# Patient Record
Sex: MALE | Race: WHITE
[De-identification: names, ages, dates, MRNs, and addresses within clinical notes are randomized per-mention and may not be internally consistent; named-entity substitution may affect disease eponyms.]

---

## 2018-10-24 ENCOUNTER — HOSPITAL ENCOUNTER (EMERGENCY)
Dept: HOSPITAL 97 - ER | Age: 78
Discharge: HOME | End: 2018-10-24
Payer: COMMERCIAL
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
## 2018-10-24 DIAGNOSIS — S70.01XA: Primary | ICD-10-CM

## 2018-10-24 DIAGNOSIS — S20.20XA: ICD-10-CM
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
## 2018-10-24 DIAGNOSIS — I10: ICD-10-CM
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
## 2018-10-24 PROCEDURE — 99283 EMERGENCY DEPT VISIT LOW MDM: CPT

## 2018-10-24 NOTE — RAD REPORT
EXAM DESCRIPTION:  RAD - Ribs  Right - 10/24/2018 4:06 pm

 

CLINICAL HISTORY:  PAIN

Fall

 

COMPARISON:  No comparisons

 

FINDINGS:  Diffuse osteopenia is noted. No displaced fracture is seen. Given the degree of osteopenia
, if pain persists, CT imaging would be useful.

## 2018-10-24 NOTE — RAD REPORT
EXAM DESCRIPTION:  RAD - Hip Right 2 View - 10/24/2018 4:07 pm

 

CLINICAL HISTORY:  Right hip pain status post fall

 

FINDINGS:  No fracture or dislocation is seen.

 

  The bones are osteoporotic.

 

If patient continues to have symptoms to suggest an occult fracture MRI would be recommended

## 2018-10-24 NOTE — EDPHYS
Physician Documentation                                                                           

 [facility]                                                                

Name: Hood Gutierrez                                                                                

Age: 77 yrs                                                                                       

Sex: Male                                                                                         

: 1940                                                                                   

MRN: [id number]                                                                                   

Arrival Date: 10/24/2018                                                                          

Time: 14:07                                                                                       

Account#: [id number]                                                                            

Bed 13                                                                                            

Private MD: HOOD RICHMOND                                                                           

ED Physician Maurizio Brooks                                                                       

HPI:                                                                                              

10/24                                                                                             

16:19 This 77 yrs old  Male presents to ER via Wheelchair with complaints of Fall    jr8 

      Injury - HIP,BACK.                                                                          

16:19 Details of fall: The patient fell from an upright position, while standing. Onset: The  jr8 

      symptoms/episode began/occurred acutely, 2 day(s) ago. Associated injuries: The patient     

      sustained mid back, right hip. Severity of symptoms: At their worst the symptoms were       

      mild, in the emergency department the symptoms are unchanged. The patient has not           

      experienced similar symptoms in the past. The patient has not recently seen a               

      physician. Patient stated that he was walking on his wood floors with his socks at          

      home. Slipped falling backwards landing on back and right hip. Pain since then. Able to     

      bear weight .                                                                               

                                                                                                  

Historical:                                                                                       

- Allergies:                                                                                      

14:26 No Known Allergies;                                                                     aj1 

- PMHx:                                                                                           

14:26 acid reflux; Hypertension; Hypothyroidism; LYMPHOMA;                                    aj1 

                                                                                                  

- Immunization history:: Adult Immunizations up to date.                                          

- Social history:: Smoking status: Patient/guardian denies using tobacco.                         

- Ebola Screening: : No symptoms or risks identified at this time.                                

                                                                                                  

                                                                                                  

ROS:                                                                                              

16:19 Eyes: Negative for injury, pain, redness, and discharge, ENT: Negative for injury,      jr8 

      pain, and discharge, Neck: Negative for injury, pain, and swelling, Cardiovascular:         

      Negative for chest pain, palpitations, and edema, Respiratory: Negative for shortness       

      of breath, cough, wheezing, and pleuritic chest pain, Abdomen/GI: Negative for              

      abdominal pain, nausea, vomiting, diarrhea, and constipation, Skin: Negative for            

      injury, rash, and discoloration, Neuro: Negative for headache, weakness, numbness,          

      tingling, and seizure.                                                                      

16:19 Back: Positive for pain at rest, pain with movement, of the right mid back.                 

16:19 MS/extremity: Positive for pain, tenderness, of the right hip.                              

                                                                                                  

Exam:                                                                                             

16:19 Head/Face:  Normocephalic, atraumatic. Eyes:  Pupils equal round and reactive to light, jr8 

      extra-ocular motions intact.  Lids and lashes normal.  Conjunctiva and sclera are           

      non-icteric and not injected.  Cornea within normal limits.  Periorbital areas with no      

      swelling, redness, or edema. ENT:  Nares patent. No nasal discharge, no septal              

      abnormalities noted.  Tympanic membranes are normal and external auditory canals are        

      clear.  Oropharynx with no redness, swelling, or masses, exudates, or evidence of           

      obstruction, uvula midline.  Mucous membranes moist. Neck:  Trachea midline, no             

      thyromegaly or masses palpated, and no cervical lymphadenopathy.  Supple, full range of     

      motion without nuchal rigidity, or vertebral point tenderness.  No Meningismus.             

      Chest/axilla:  Normal chest wall appearance and motion.  Nontender with no deformity.       

      No lesions are appreciated. Cardiovascular:  Regular rate and rhythm with a normal S1       

      and S2.  No gallops, murmurs, or rubs.  Normal PMI, no JVD.  No pulse deficits.             

      Respiratory:  Lungs have equal breath sounds bilaterally, clear to auscultation and         

      percussion.  No rales, rhonchi or wheezes noted.  No increased work of breathing, no        

      retractions or nasal flaring. Abdomen/GI:  Soft, non-tender, with normal bowel sounds.      

      No distension or tympany.  No guarding or rebound.  No evidence of tenderness               

      throughout. Skin:  Warm, dry with normal turgor.  Normal color with no rashes, no           

      lesions, and no evidence of cellulitis. Neuro:  Awake and alert, GCS 15, oriented to        

      person, place, time, and situation.  Cranial nerves II-XII grossly intact.  Motor           

      strength 5/5 in all extremities.  Sensory grossly intact.  Cerebellar exam normal.          

      Normal gait.                                                                                

16:19 Back: pain, that is moderate, of the  right mid back, ROM is normal, normal spinal          

      alignment noted, CVA tenderness, is absent, vertebral tenderness, is not appreciated,       

      bruising noted to right mid back.                                                           

16:19 Musculoskeletal/extremity: Extremities: grossly normal except: noted in the right hip:      

      pain, tenderness, ROM: intact in all extremities, full active range of motion, full         

      passive range of motion, Circulation is intact in all extremities. Sensation intact.        

                                                                                                  

Vital Signs:                                                                                      

14:26  / 80; Pulse 63; Resp 18; Temp 97.9; Pulse Ox 98% on R/A; Weight 83.91 kg (R);    aj1 

      Height 5 ft. 9 in. (175.26 cm) (R); Pain 0/10;                                              

14:26 Body Mass Index 27.32 (83.91 kg, 175.26 cm)                                             aj1 

                                                                                                  

MDM:                                                                                              

15:27 Patient medically screened.                                                             jr8 

16:41 Data reviewed: vital signs, nurses notes, radiologic studies, plain films, and as a     jr8 

      result, I will discharge patient. Data interpreted: Pulse oximetry: on room air is 98       

      %. Interpretation: normal. Counseling: I had a detailed discussion with the patient         

      and/or guardian regarding: the historical points, exam findings, and any diagnostic         

      results supporting the discharge/admit diagnosis, radiology results, the need for           

      outpatient follow up, a family practitioner, to return to the emergency department if       

      symptoms worsen or persist or if there are any questions or concerns that arise at home.    

                                                                                                  

10/24                                                                                             

15:28 Order name: XRAY Ribs RIGHT                                                             jr8 

10/24                                                                                             

15:28 Order name: XRAY Hip RIGHT 2 view; Complete Time: 16:18                                 jr8 

                                                                                                  

Administered Medications:                                                                         

16:58 Drug: Norco (7.5 mg-325 mg) 1 tabs Route: PO;                                           la1 

16:58 Follow up: Response: No adverse reaction                                                la1 

                                                                                                  

                                                                                                  

Disposition:                                                                                      

10/24/18 16:42 Discharged to Home. Impression: Contusion of right hip, Rib contusion .            

- Condition is Stable.                                                                            

- Discharge Instructions: Contusion, Hip Pain.                                                    

                                                                                                  

- Medication Reconciliation Form, Thank You Letter, Antibiotic Education, Prescription            

  Opioid Use form.                                                                                

- Follow up: Private Physician; When: 5 - 6 days; Reason: Recheck today's complaints,             

  Continuance of care, Re-evaluation by your physician.                                           

- Problem is new.                                                                                 

- Symptoms have improved.                                                                         

                                                                                                  

                                                                                                  

                                                                                                  

Addendum:                                                                                         

2018                                                                                        

     08:08 Co-signature as Attending Physician, Maurizio Brooks MD I agree with the assessment and   k
dr

           plan of care.                                                                          

                                                                                                  

Signatures:                                                                                       

Dispatcher [employer]                           EDIlda Braxton RN                     RN   aj1                                                  

Maurizio Brooks MD MD   [facility]                                                  

Luis Reyes PA                        PA   jr8                                                  

Javier Bazzi RN                         RN   la1                                                  

                                                                                                  

Corrections: (The following items were deleted from the chart)                                    

10/24                                                                                             

16:59 16:42 10/24/2018 16:42 Discharged to Home. Impression: Contusion of right hip; Rib      la1 

      contusion . Condition is Stable. Forms are Medication Reconciliation Form, Thank You        

      Letter, Antibiotic Education, Prescription Opioid Use. Follow up: Private Physician;        

      When: 5 - 6 days; Reason: Recheck today's complaints, Continuance of care,                  

      Re-evaluation by your physician. Problem is new. Symptoms have improved. jr8                

                                                                                                  

**************************************************************************************************

## 2018-10-24 NOTE — ER
Nurse's Notes                                                                                     

 [facility]                                                                

Name: Hood Gutierrez                                                                                

Age: 77 yrs                                                                                       

Sex: Male                                                                                         

: 1940                                                                                   

MRN: [id number]                                                                                   

Arrival Date: 10/24/2018                                                                          

Time: 14:07                                                                                       

Account#: [id number]                                                                            

Bed 13                                                                                            

Private MD: HOOD RICHMOND                                                                           

Diagnosis: Contusion of right hip;Rib contusion                                                   

                                                                                                  

Presentation:                                                                                     

10/24                                                                                             

14:23 Presenting complaint: Child states: He fell out of bed 2 days ago, when he was reaching aj1 

      down to get his house shoes. I've been watching him and he doesn't seem to be getting       

      any better. At first he said his back where he has a bruise was the only place that         

      hurts but now he's pointing lower down. Denies LOC. Patient takes ASA 81 mg daily.          

      Denies hitting his head in the fall. Patient states that he only has pain when he           

      moves. Care prior to arrival: None. Mechanism of Injury: Fall out of bed. Trauma event      

      details[place]                                                  

14:23 Acuity: AMI 3                                                                           aj1 

14:23 Method Of Arrival: Wheelchair                                                           aj1 

14:26 Transition of care: patient was not received from another setting of care. Onset of     aj1 

      symptoms was 2018. Risk Assessment: Do you want to hurt yourself or someone     

      else? Patient reports no desire to harm self or others. Initial Sepsis Screen: Does the     

      patient meet any 2 criteria? No. Patient's initial sepsis screen is negative. Does the      

      patient have a suspected source of infection? No. Patient's initial sepsis screen is        

      negative.                                                                                   

                                                                                                  

Triage Assessment:                                                                                

14:26 General: Appears in no apparent distress. comfortable, Behavior is calm, cooperative,   aj1 

      appropriate for age. Pain: Complains of pain in low back area Pain currently is 0 out       

      of 10 on a pain scale. Neuro: Level of Consciousness is awake, alert, obeys commands.       

      Cardiovascular: Patient's skin is warm and dry. Respiratory: Airway is patent               

      Respiratory effort is even, unlabored, Respiratory pattern is regular, symmetrical.         

                                                                                                  

Trauma Activation: Not Applicable                                                                 

 Physician: ED Physician; Name: ; Notified At: ; Arrived At:                                      

 Physician: General Surgeon; Name: ; Notified At: ; Arrived At:                                   

 Physician: Radiology; Name: ; Notified At: ; Arrived At:                                         

 Physician: Respiratory; Name: ; Notified At: ; Arrived At:                                       

 Physician: Lab; Name: ; Notified At: ; Arrived At:                                               

                                                                                                  

Historical:                                                                                       

- Allergies:                                                                                      

14:26 No Known Allergies;                                                                     aj1 

- PMHx:                                                                                           

14:26 acid reflux; Hypertension; Hypothyroidism; LYMPHOMA;                                    aj1 

                                                                                                  

- Immunization history:: Adult Immunizations up to date.                                          

- Social history:: Smoking status: Patient/guardian denies using tobacco.                         

- Ebola Screening: : No symptoms or risks identified at this time.                                

                                                                                                  

                                                                                                  

Screening:                                                                                        

15:24 Abuse screen: Denies threats or abuse. Nutritional screening: No deficits noted.        la1 

      Tuberculosis screening: No symptoms or risk factors identified. Fall Risk None              

      identified.                                                                                 

                                                                                                  

Assessment:                                                                                       

15:24 General: Appears in no apparent distress. Behavior is calm, cooperative. Pain:          la1 

      Complains of pain in back and low back area. Neuro: Level of Consciousness is awake,        

      alert, obeys commands. Cardiovascular: Patient's skin is warm and dry. Respiratory:         

      Airway is patent Respiratory effort is even, unlabored, Respiratory pattern is regular,     

      symmetrical. GI: Abdomen is round non-distended. : No signs and/or symptoms were          

      reported regarding the genitourinary system. Musculoskeletal: Circulation, motion, and      

      sensation intact. Range of motion: intact in all extremities.                               

16:04 Reassessment: Patient appears in no apparent distress at this time. No changes from     la1 

      previously documented assessment. Patient and/or family updated on plan of care and         

      expected duration. Pain level reassessed.                                                   

                                                                                                  

Vital Signs:                                                                                      

14:26  / 80; Pulse 63; Resp 18; Temp 97.9; Pulse Ox 98% on R/A; Weight 83.91 kg (R);    aj1 

      Height 5 ft. 9 in. (175.26 cm) (R); Pain 0/10;                                              

14:26 Body Mass Index 27.32 (83.91 kg, 175.26 cm)                                             aj1 

                                                                                                  

ED Course:                                                                                        

14:07 Patient arrived in ED.                                                                  sb2 

14:07 HOOD RICHMOND is Private Physician.                                                       sb2 

14:26 Triage completed.                                                                       aj1 

14:26 Arm band placed on Patient placed in waiting room, Patient notified of wait time.       aj1 

14:48 Javier Bazzi RN is Primary Nurse.                                                       la1 

15:13 Luis Reyes PA is PHCP.                                                               jr8 

15:13 Maurizio Brooks MD is Attending Physician.                                              jr8 

15:25 Call light in reach. Side rails up X 1.                                                 la1 

15:56 X-ray completed. Patient tolerated procedure well. Patient moved back from radiology.   az  

15:57 XRAY Ribs RIGHT In Process Unspecified.                                                 EDMS

15:57 XRAY Hip RIGHT 2 view In Process Unspecified.                                           EDMS

16:58 No provider procedures requiring assistance completed. Patient did not have IV access   la1 

      during this emergency room visit.                                                           

                                                                                                  

Administered Medications:                                                                         

16:58 Drug: Norco (7.5 mg-325 mg) 1 tabs Route: PO;                                           la1 

16:58 Follow up: Response: No adverse reaction                                                la1 

                                                                                                  

                                                                                                  

Outcome:                                                                                          

16:42 Discharge ordered by MD.                                                                jacobo 

16:58 Discharged to home ambulatory.                                                          la1 

16:58 Condition: stable                                                                           

16:58 Discharge instructions given to patient, Instructed on discharge instructions, follow       

      up and referral plans. medication usage, Demonstrated understanding of instructions,        

      follow-up care, medications.                                                                

16:59 Patient left the ED.                                                                    la1 

                                                                                                  

Signatures:                                                                                       

Dispatcher [employer]                           Ilda Espinoza RN                     RN   aj1                                                  

Luis Reyes PA PA   jr8                                                  

Javier Bazzi RN RN   la1                                                  

Mariama Rodriguez2                                                  

Corinne Beal                                                   

                                                                                                  

Corrections: (The following items were deleted from the chart)                                    

14:28 14:26 Arm band placed on Patient placed in an exam room, aj1                            aj1 

                                                                                                  

**************************************************************************************************

## 2019-03-16 ENCOUNTER — HOSPITAL ENCOUNTER (EMERGENCY)
Dept: HOSPITAL 97 - ER | Age: 79
Discharge: HOME | End: 2019-03-16
Payer: COMMERCIAL
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
## 2019-03-16 DIAGNOSIS — E03.9: ICD-10-CM

## 2019-03-16 DIAGNOSIS — M19.90: ICD-10-CM

## 2019-03-16 DIAGNOSIS — K21.9: ICD-10-CM
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
## 2019-03-16 DIAGNOSIS — M25.512: Primary | ICD-10-CM

## 2019-03-16 DIAGNOSIS — I10: ICD-10-CM

## 2019-03-16 PROCEDURE — 99283 EMERGENCY DEPT VISIT LOW MDM: CPT

## 2019-03-16 NOTE — ER
Nurse's Notes                                                                                     

 [facility]                                                                

Name: Hood Gutierrez                                                                                

Age: 78 yrs                                                                                       

Sex: Male                                                                                         

: 1940                                                                                   

MRN: [id number]                                                                                   

Arrival Date: 2019                                                                          

Time: 15:04                                                                                       

Account#: [id number]                                                                            

Bed 10                                                                                            

Private MD: HOOD RICHMOND                                                                           

Diagnosis: Fall on same level from slipping, tripping and stumbling with subsequent striking      

  against object;Pain in left shoulder;Osteoarthritis, unspecified site                           

                                                                                                  

Presentation:                                                                                     

                                                                                             

15:58 Presenting complaint: Patient states: "I was going down a ramp so my feet came under me aa5 

      and I fell onto my left side". Pt reports fall was 2 days ago. c/o left shoulder pain.      

      Denies head injury, Negative LOC. Transition of care: patient was not received from         

      another setting of care. Onset of symptoms was 2019. Risk Assessment: Do you want     

      to hurt yourself or someone else? Patient reports no desire to harm self or others.         

      Care prior to arrival: None.                                                                

15:58 Method Of Arrival: Ambulatory                                                           aa5 

15:58 Acuity: AMI 4                                                                           aa5 

18:25 Initial Sepsis Screen: Does the patient meet any 2 criteria? No. Patient's initial      fc  

      sepsis screen is negative. Does the patient have a suspected source of infection? No.       

      Patient's initial sepsis screen is negative.                                                

                                                                                                  

Historical:                                                                                       

- Allergies:                                                                                      

16:00 No Known Allergies;                                                                     aa5 

- PMHx:                                                                                           

16:00 acid reflux; Hypertension; Hypothyroidism; LYMPHOMA;                                    aa5 

                                                                                                  

- Immunization history:: Flu vaccine is not up to date.                                           

- Social history:: Smoking status: Patient/guardian denies using tobacco.                         

- Ebola Screening: : No symptoms or risks identified at this time.                                

                                                                                                  

                                                                                                  

Screenin:08 Abuse screen: Denies threats or abuse. Nutritional screening: No deficits noted.        fc  

      Tuberculosis screening: No symptoms or risk factors identified. Fall Risk Fall in past      

      12 months (25 points). No secondary diagnosis (0 pts). No IV (0 pts). Ambulatory Aid-       

      None/Bed Rest/Nurse Assist (0 pts). Gait- Normal/Bed Rest/Wheelchair (0 pts) Mental         

      Status- Overestimates/Forgets Limitations (15 pts.). Total Knowles Fall Scale indicates       

      Low Risk Score (25-44 pts). Fall prevention measures have been instituted. Side Rails       

      Up X 2 Placed close to Nursing Station Frequent Obs/Assesments occuring Family Present      

      and informed to notify staff if they need to leave bedside As available Patient and         

      Family Educated on Fall Prevention Program and strategies.                                  

                                                                                                  

Assessment:                                                                                       

17:08 General: Appears uncomfortable, slender, Behavior is calm, cooperative, appropriate for fc  

      age. Pain: Complains of pain in left shoulder Pain currently is 6 out of 10 on a pain       

      scale. Quality of pain is described as aching, dull, throbbing, Pain began 2-3 days         

      ago. Is episodic, Aggravated by increased activity, repositioning. Neuro: Level of          

      Consciousness is awake, alert, obeys commands, Oriented to person, place, time,             

      situation, Appropriate for age. Cardiovascular: No deficits noted. Respiratory: No          

      deficits noted. GI: No deficits noted. : No deficits noted. EENT: No deficits noted.      

      Derm: Skin is pink, warm \T\ dry. Musculoskeletal: Circulation, motion, and sensation       

      intact. Capillary refill < 3 seconds, Range of motion: limited in left shoulder.            

17:25 Reassessment: No changes from previously documented assessment. Patient and/or family   fc  

      updated on plan of care and expected duration. Pain level reassessed. Patient is alert,     

      oriented x 3, equal unlabored respirations, skin warm/dry/pink. Lynn NP in to see and     

      examine pt.                                                                                 

17:52 Reassessment: Xrays complete and medication given as ordered.                             

18:15 Reassessment: Lynn NP in to speak with pt and wife re: results of xrays. Pt refusing  fc  

      slings states that they have one at home.                                                   

                                                                                                  

Vital Signs:                                                                                      

16:00  / 92; Pulse 62; Resp 16 S; Temp 97.7(TE); Pulse Ox 97% on R/A; Weight 82.55 kg   aa5 

      (R); Pain 2/10;                                                                             

                                                                                                  

ED Course:                                                                                        

15:04 Patient arrived in ED.                                                                  rg4 

15:04 HOOD RICHMOND is Private Physician.                                                       rg4 

15:58 Arm band placed on.                                                                     aa5 

16:00 Triage completed.                                                                       aa5 

16:58 [facility]                                                        snw 

16:58 Leif Bermudez MD is Attending Physician.                                                snw 

17:08 Patient has correct armband on for positive identification. Bed in low position. Call   fc  

      light in reach.                                                                             

17:38 No provider procedures requiring assistance completed.                                  fc  

18:01 Shoulder Left (2 View) XRAY In Process Unspecified.                                     EDMS

18:15 Patient did not have IV access during this emergency room visit.                        fc  

                                                                                                  

Administered Medications:                                                                         

17:51 Drug: Valium 5 mg Route: PO;                                                            fc  

18:59 Follow up: Response: No adverse reaction; Marked relief of symptoms; Pain is decreased  fc  

                                                                                                  

                                                                                                  

Outcome:                                                                                          

18:23 Discharge ordered by MD.                                                                tyler 

18:25 Discharged to home ambulatory, with family.                                               

18:25 Condition: good                                                                             

18:25 Discharge instructions given to patient, family, Instructed on discharge instructions,      

      follow up and referral plans. no drinking with medication, no driving heavy equipment,      

      medication usage, Demonstrated understanding of instructions, follow-up care,               

      medications, Prescriptions given X 1.                                                       

19:01 Patient left the ED.                                                                    fc  

                                                                                                  

Signatures:                                                                                       

Dispatcher [employer]                           EDMS                                                 

Lynn Gillis, FRANCKC                 FNP-Dia Barger RN                   RN                                                      

Mago Hou RN                     RN   Marlen Mensah                                 rg4                                                  

                                                                                                  

Corrections: (The following items were deleted from the chart)                                    

16:01 16:00  / 92; Pulse 62bpm; Resp 16bpm; Spontaneous; Pulse Ox 97% RA; Temp 97.7F    aa5 

      Temporal; 82.55 kg Reported; aa5                                                            

19:01 18:59 Discharged to home ambulatory, with family, [facility]  

19: 18:59 Condition: good [facility]  

19:01 18:59 Discharge instructions given to patient, family, Instructed on discharge            

      instructions, follow up and referral plans. no drinking with medication, no driving         

      heavy equipment, medication usage, Demonstrated understanding of instructions,              

      follow-up care, medications, Prescriptions given X 1, fc                                    

                                                                                                  

**************************************************************************************************

## 2019-03-16 NOTE — RAD REPORT
EXAM DESCRIPTION:  RAD - Shoulder  Left 2 View - 3/16/2019 6:01 pm

 

CLINICAL HISTORY:  pain post fall

 

COMPARISON:  No comparisons

 

FINDINGS:  Mild AC joint and glenohumeral joint arthritic changes are present. An acute fracture is n
ot seen.

## 2019-03-16 NOTE — EDPHYS
Physician Documentation                                                                           

 [facility]                                                                

Name: Hood Gutierrez                                                                                

Age: 78 yrs                                                                                       

Sex: Male                                                                                         

: 1940                                                                                   

MRN: [id number]                                                                                   

Arrival Date: 2019                                                                          

Time: 15:04                                                                                       

Account#: [id number]                                                                            

Bed 10                                                                                            

Private MD: [facility] ED Physician Leif Bermudez                                                                         

HPI:                                                                                              

                                                                                             

17:40 This 78 yrs old  Male presents to ER via Ambulatory with complaints of Fall    snw 

      Injury, Shoulder Injury.                                                                    

17:40 Details of fall: The patient fell from an upright position, while walking, on a ramp.   snw 

      Onset: The symptoms/episode began/occurred suddenly, 2 day(s) ago, and became               

      persistent. Associated injuries: The patient sustained left shoulder, decreased range       

      of motion. Severity of symptoms: At their worst the symptoms were moderate. The patient     

      has not experienced similar symptoms in the past. It is unknown whether or not the          

      patient has recently seen a physician. no LOC, pt states he just slipped, no other          

      injury.                                                                                     

                                                                                                  

Historical:                                                                                       

- Allergies:                                                                                      

16:00 No Known Allergies;                                                                     aa5 

- PMHx:                                                                                           

16:00 acid reflux; Hypertension; Hypothyroidism; LYMPHOMA;                                    aa5 

                                                                                                  

- Immunization history:: Flu vaccine is not up to date.                                           

- Social history:: Smoking status: Patient/guardian denies using tobacco.                         

- Ebola Screening: : No symptoms or risks identified at this time.                                

                                                                                                  

                                                                                                  

ROS:                                                                                              

17:40 Constitutional: Negative for fever, chills, and weight loss, Eyes: Negative for injury, snw 

      pain, redness, and discharge, ENT: Negative for injury, pain, and discharge, Neck:          

      Negative for injury, pain, and swelling, Cardiovascular: Negative for chest pain,           

      palpitations, and edema, Respiratory: Negative for shortness of breath, cough,              

      wheezing, and pleuritic chest pain, Abdomen/GI: Negative for abdominal pain, nausea,        

      vomiting, diarrhea, and constipation, Back: Negative for injury and pain, : Negative      

      for injury, bleeding, discharge, and swelling, Skin: Negative for injury, rash, and         

      discoloration, Neuro: Negative for headache, weakness, numbness, tingling, and seizure.     

17:40 MS/extremity: Positive for injury or acute deformity, decreased range of motion, pain,      

      of the left shoulder.                                                                       

                                                                                                  

Exam:                                                                                             

17:39 Constitutional:  This is a well developed, well nourished patient who is awake, alert,  snw 

      and in no acute distress. Head/Face:  Normocephalic, atraumatic. Eyes:  Pupils equal        

      round and reactive to light, extra-ocular motions intact.  Lids and lashes normal.          

      Conjunctiva and sclera are non-icteric and not injected.  Cornea within normal limits.      

      Periorbital areas with no swelling, redness, or edema. ENT:  Nares patent. No nasal         

      discharge, no septal abnormalities noted.  Tympanic membranes are normal and external       

      auditory canals are clear.  Oropharynx with no redness, swelling, or masses, exudates,      

      or evidence of obstruction, uvula midline.  Mucous membranes moist. Neck:  Trachea          

      midline, no thyromegaly or masses palpated, and no cervical lymphadenopathy.  Supple,       

      full range of motion without nuchal rigidity, or vertebral point tenderness.  No            

      Meningismus. Chest/axilla:  Normal chest wall appearance and motion.  Nontender with no     

      deformity.  No lesions are appreciated. Cardiovascular:  Regular rate and rhythm with a     

      normal S1 and S2.  No gallops, murmurs, or rubs.  Normal PMI, no JVD.  No pulse             

      deficits. Respiratory:  Lungs have equal breath sounds bilaterally, clear to                

      auscultation and percussion.  No rales, rhonchi or wheezes noted.  No increased work of     

      breathing, no retractions or nasal flaring. Abdomen/GI:  Soft, non-tender, with normal      

      bowel sounds.  No distension or tympany.  No guarding or rebound.  No evidence of           

      tenderness throughout. Back:  No spinal tenderness.  No costovertebral tenderness.          

      Full range of motion. Skin:  Warm, dry with normal turgor.  Normal color with no            

      rashes, no lesions, and no evidence of cellulitis. Neuro:  Awake and alert, GCS 15,         

      oriented to person, place, time, and situation.  Cranial nerves II-XII grossly intact.      

      Motor strength 5/5 in all extremities.  Sensory grossly intact.  Cerebellar exam            

      normal.  Normal gait. Psych:  Awake, alert, with orientation to person, place and time.     

       Behavior, mood, and affect are within normal limits.                                       

17:39 Musculoskeletal/extremity: Extremities: grossly normal except: noted in the left            

      shoulder: decreased ROM, + painful arc, ROM: limited active range of motion due to          

      pain, in the left shoulder, Circulation is intact in all extremities. Sensation intact.     

      Compartment Syndrome exam of affected extremity: is normal.                                 

                                                                                                  

Vital Signs:                                                                                      

16:00  / 92; Pulse 62; Resp 16 S; Temp 97.7(TE); Pulse Ox 97% on R/A; Weight 82.55 kg   aa5 

      (R); Pain 2/10;                                                                             

                                                                                                  

MDM:                                                                                              

16:59 Patient medically screened.                                                             snw 

18:25 Data reviewed: vital signs, nurses notes. Data interpreted: Pulse oximetry: on room air snw 

      is 97 %. Interpretation: normal. Counseling: I had a detailed discussion with the           

      patient and/or guardian regarding: the historical points, exam findings, and any            

      diagnostic results supporting the discharge/admit diagnosis, the presence of at least       

      one elevated blood pressure reading (>120/80) during this emergency department visit,       

      radiology results, the need for outpatient follow up, to return to the emergency            

      department if symptoms worsen or persist or if there are any questions or concerns that     

      arise at home. Special discussion: I have referred the patient to see his PCP for           

      further evaluation of high blood pressure. Based on the history and exam findings,          

      there is no indication for further emergent testing or inpatient evaluation. I              

      discussed with the patient/guardian the need to see the orthopedic surgeon for further      

      evaluation of the symptoms. I discussed with the patient/guardian the need to see the       

      primary care provider for further evaluation of the symptoms.                               

                                                                                                  

                                                                                             

16:01 Order name: Shoulder Left (2 View) XRAY; Complete Time: 18:11                           aa5 

                                                                                                  

Administered Medications:                                                                         

17:51 Drug: Valium 5 mg Route: PO;                                                            fc  

18:59 Follow up: Response: No adverse reaction; Marked relief of symptoms; Pain is decreased  fc  

                                                                                                  

                                                                                                  

Disposition:                                                                                      

                                                                                             

09:45 Co-signature as Attending Physician, Leif Bermudez MD.                                    rn  

                                                                                                  

Disposition:                                                                                      

19 18:23 Discharged to Home. Impression: Fall on same level from slipping, tripping and     

  stumbling with subsequent striking against object, Pain in left shoulder,                       

  Osteoarthritis, unspecified site.                                                               

- Condition is Stable.                                                                            

- Discharge Instructions: Joint Pain, Arthritis, Musculoskeletal Pain, Shoulder Pain,             

  Shoulder Range of Motion Exercises, Cryotherapy, Heat Therapy.                                  

- Prescriptions for orphenadrine citrate 100 mg Oral Tablet Sustained Release - take 1            

  tablet by ORAL route 2 times per day As needed; 20 tablet.                                      

- Medication Reconciliation Form, Thank You Letter, Antibiotic Education, Prescription            

  Opioid Use form.                                                                                

- Follow up: Private Physician; When: 2 - 3 days; Reason: Recheck today's complaints,             

  Continuance of care, Re-evaluation by your physician. Follow up: Emergency                      

  Department; When: As needed; Reason: Worsening of condition.                                    

                                                                                                  

                                                                                                  

                                                                                                  

Signatures:                                                                                       

Dispatcher [employer]                           Lynn Peña, YOBANY-C                 FNP-Csnw                                                  

Dia Rizo RN                   RN                                                      

Leif Bermudez MD MD rn Calderon, Audri, RN                     RN   aa5                                                  

                                                                                                  

Corrections: (The following items were deleted from the chart)                                    

                                                                                             

18:59 18:22 Sling ordered. snw                                                                  

19:01 18:23 2019 18:23 Discharged to Home. Impression: Fall on same level from          fc  

      slipping, tripping and stumbling with subsequent striking against object; Pain in left      

      shoulder; Osteoarthritis, unspecified site. Condition is Stable. Forms are Medication       

      Reconciliation Form, Thank You Letter, Antibiotic Education, Prescription Opioid Use.       

      Follow up: Private Physician; When: 2 - 3 days; Reason: Recheck today's complaints,         

      Continuance of care, Re-evaluation by your physician. Follow up: Emergency Department;      

      When: As needed; Reason: Worsening of condition. snw                                        

                                                                                                  

**************************************************************************************************